# Patient Record
Sex: MALE | Race: OTHER
[De-identification: names, ages, dates, MRNs, and addresses within clinical notes are randomized per-mention and may not be internally consistent; named-entity substitution may affect disease eponyms.]

---

## 2019-02-06 ENCOUNTER — HOSPITAL ENCOUNTER (OUTPATIENT)
Dept: HOSPITAL 61 - SLPLAB | Age: 54
Discharge: HOME | End: 2019-02-06
Attending: INTERNAL MEDICINE
Payer: COMMERCIAL

## 2019-02-06 DIAGNOSIS — G47.61: ICD-10-CM

## 2019-02-06 DIAGNOSIS — G47.33: Primary | ICD-10-CM

## 2019-02-06 DIAGNOSIS — G47.34: ICD-10-CM

## 2019-02-06 PROCEDURE — 95810 POLYSOM 6/> YRS 4/> PARAM: CPT

## 2019-02-07 NOTE — SLEEP
DATE OF STUDY:  02/07/2019



SLEEP STUDY



ATTENDING PHYSICIAN:  Terry Joseph MD.



The patient is 53 years old who weighs 225 pounds with a BMI of 36.  The

patient's Lowell score was 12.  The patient underwent split night study at

[facility].



During the night study, the patient spent 411 minutes in bed and slept for 276

minutes with a low sleep efficiency of 67%.  Sleep latency was 27 minutes with a

REM latency of 233 minutes.  Overall, sleep architecture showed increased stage

1 sleep, normal stage 2 sleep, increased slow wave and normal REM sleep.



During the initial diagnostic portion of the study, the patient slept for 89

minutes.  During that time, the patient had 103 obstructive apneas, 17 mixed

apneas, no central apneas and 13 hypopneas.  The patient's apnea hypopnea index

was 90 per hour, supine index 80 per hour.  REM sleep was not seen during the

diagnostic portion of the study.



EKG monitoring revealed normal sinus rhythm with occasional PACs.  Average heart

rate was 79 beats per minute.  No arrhythmias observed.



PLMS were seen at index of 30 per hour and none caused EEG arousals.



Nocturnal oximetry study revealed an average oxygen saturation of 96% with the

lowest of 84%.  A 13.6% of time oxygen saturation remained between 80% and 89%.



The patient met the criteria for CPAP initiation.  It was started at 5 cm water

and titrated up to 14 cm water.  At the final pressure, the patient slept for

121 minutes.  The patient's AHI was reduced to 4 per hour and oxygen saturation

remained above 91%.  The patient used small-sized full face mask.  The patient

had supine as well as REM sleep at the final pressure.



IMPRESSION:

1.  Severe sleep apnea-hypopnea syndrome at an AHI of 90 per hour.

2.  Nocturnal hypoxia secondary to obstructive sleep apnea, but resolved with

CPAP.

3.  Moderate periodic limb movements of sleep without any significant EEG

arousals.  This does not need to be treated unless the patient has symptoms of

restless legs during the day.



RECOMMENDATIONS:

1.  CPAP at 14 cm water completely eliminated the patient's sleep apnea and

should be used on a nightly basis.

2.  Follow up in 4-6 weeks to assess compliance with CPAP and to document

clinical improvement.

3.  Weight loss is strongly advised.

4.  Avoid CNS depressants.

5.  Caution regarding driving until symptoms of sleep apnea resolve with the use

of CPAP.

6.  The patient used small-sized full face mask.

 



______________________________

LATOYA JOHNSON MD



DR:  ALY/delphine  JOB#:  [id number] / [id number]

DD:  02/07/2019 10:59  DT:  02/07/2019 11:11



Terry Yeager MD